# Patient Record
Sex: MALE | Race: BLACK OR AFRICAN AMERICAN
[De-identification: names, ages, dates, MRNs, and addresses within clinical notes are randomized per-mention and may not be internally consistent; named-entity substitution may affect disease eponyms.]

---

## 2020-08-31 ENCOUNTER — HOSPITAL ENCOUNTER (EMERGENCY)
Dept: HOSPITAL 53 - M ED | Age: 30
Discharge: HOME | End: 2020-08-31
Payer: SELF-PAY

## 2020-08-31 VITALS — DIASTOLIC BLOOD PRESSURE: 81 MMHG | SYSTOLIC BLOOD PRESSURE: 134 MMHG

## 2020-08-31 VITALS — HEIGHT: 69 IN | BODY MASS INDEX: 29.68 KG/M2 | WEIGHT: 200.42 LBS
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
## 2020-08-31 DIAGNOSIS — S93.401A: Primary | ICD-10-CM

## 2020-09-28 NOTE — REP
RIGHT ANKLE SERIES 



CLINICAL: Trauma/injury.



TECHNIQUE: AP, lateral, bilateral oblique views of the right ankle. 



FINDINGS: 

No acute fracture or dislocation. Skeletal structures and joint spaces are 
intact. Ankle mortise intact. Soft tissues are normal. No subcutaneous emphysema
or foreign body.  



IMPRESSION: 

No acute fracture or dislocation.  

MTDD

## 2020-10-08 ENCOUNTER — HOSPITAL ENCOUNTER (EMERGENCY)
Dept: HOSPITAL 53 - M ED | Age: 30
Discharge: HOME | End: 2020-10-08
Payer: COMMERCIAL

## 2020-10-08 VITALS — SYSTOLIC BLOOD PRESSURE: 124 MMHG | DIASTOLIC BLOOD PRESSURE: 65 MMHG

## 2020-10-08 VITALS — WEIGHT: 215.39 LBS | BODY MASS INDEX: 31.9 KG/M2 | HEIGHT: 69 IN

## 2020-10-08 DIAGNOSIS — R10.9: ICD-10-CM

## 2020-10-08 DIAGNOSIS — R51.9: ICD-10-CM

## 2020-10-08 DIAGNOSIS — R19.7: ICD-10-CM

## 2020-10-08 DIAGNOSIS — R11.2: Primary | ICD-10-CM

## 2020-10-08 LAB
ALBUMIN SERPL BCG-MCNC: 4.8 GM/DL (ref 3.2–5.2)
ALT SERPL W P-5'-P-CCNC: 28 U/L (ref 12–78)
BASOPHILS # BLD AUTO: 0 10^3/UL (ref 0–0.2)
BASOPHILS NFR BLD AUTO: 0.1 % (ref 0–1)
BILIRUB CONJ SERPL-MCNC: 0.2 MG/DL (ref 0–0.2)
BILIRUB SERPL-MCNC: 1 MG/DL (ref 0.2–1)
EOSINOPHIL # BLD AUTO: 0 10^3/UL (ref 0–0.5)
EOSINOPHIL NFR BLD AUTO: 0.1 % (ref 0–3)
HCT VFR BLD AUTO: 44 % (ref 42–52)
HGB BLD-MCNC: 14.9 G/DL (ref 13.5–17.5)
LIPASE SERPL-CCNC: 132 U/L (ref 73–393)
LYMPHOCYTES # BLD AUTO: 1.3 10^3/UL (ref 1.5–5)
LYMPHOCYTES NFR BLD AUTO: 8.1 % (ref 24–44)
MCH RBC QN AUTO: 30.3 PG (ref 27–33)
MCHC RBC AUTO-ENTMCNC: 33.9 G/DL (ref 32–36.5)
MCV RBC AUTO: 89.6 FL (ref 80–96)
MONOCYTES # BLD AUTO: 0.5 10^3/UL (ref 0–0.8)
MONOCYTES NFR BLD AUTO: 3.3 % (ref 0–5)
NEUTROPHILS # BLD AUTO: 13.6 10^3/UL (ref 1.5–8.5)
NEUTROPHILS NFR BLD AUTO: 87.9 % (ref 36–66)
PLATELET # BLD AUTO: 286 10^3/UL (ref 150–450)
PROT SERPL-MCNC: 8.7 GM/DL (ref 6.4–8.2)
RBC # BLD AUTO: 4.91 10^6/UL (ref 4.3–6.1)
WBC # BLD AUTO: 15.5 10^3/UL (ref 4–10)

## 2020-10-08 PROCEDURE — 80076 HEPATIC FUNCTION PANEL: CPT

## 2020-10-08 PROCEDURE — 80047 BASIC METABLC PNL IONIZED CA: CPT

## 2020-10-08 PROCEDURE — 83605 ASSAY OF LACTIC ACID: CPT

## 2020-10-08 PROCEDURE — 96372 THER/PROPH/DIAG INJ SC/IM: CPT

## 2020-10-08 PROCEDURE — 83690 ASSAY OF LIPASE: CPT

## 2020-10-08 PROCEDURE — 96365 THER/PROPH/DIAG IV INF INIT: CPT

## 2020-10-08 PROCEDURE — 99284 EMERGENCY DEPT VISIT MOD MDM: CPT

## 2020-10-08 PROCEDURE — 96361 HYDRATE IV INFUSION ADD-ON: CPT

## 2020-10-08 PROCEDURE — 85025 COMPLETE CBC W/AUTO DIFF WBC: CPT

## 2020-10-08 PROCEDURE — 96375 TX/PRO/DX INJ NEW DRUG ADDON: CPT

## 2020-10-08 PROCEDURE — 74177 CT ABD & PELVIS W/CONTRAST: CPT

## 2020-10-08 NOTE — REPVR
PROCEDURE INFORMATION: 

Exam: CT Abdomen And Pelvis With Contrast 

Exam date and time: 10/8/2020 12:19 PM 

Age: 30 years old 

Clinical indication: Nausea and vomiting and other: Diarrhea; Abdominal pain; 

Additional info: Lower abd pain, n/v/d 



TECHNIQUE: 

Imaging protocol: Computed tomography of the abdomen and pelvis with 

intravenous contrast. 

Radiation optimization: All CT scans at this facility use at least one of these 

dose optimization techniques: automated exposure control; mA and/or kV 

adjustment per patient size (includes targeted exams where dose is matched to 

clinical indication); or iterative reconstruction. 

Contrast material: ISOVUE 370; Contrast volume: 100 ml; Contrast route: 

INTRAVENOUS (IV);  



COMPARISON: 

No relevant prior studies available. 



FINDINGS: 

Liver: Normal. No mass. 

Gallbladder and bile ducts: Normal. No calcified stones. No ductal dilation. 

Pancreas: Normal. No ductal dilation. 

Spleen: Normal. No splenomegaly. 

Adrenals: Normal. No mass. 

Kidneys and ureters: Normal. No hydronephrosis. 

Stomach and bowel: Unremarkable. No obstruction. No mucosal thickening. 

Appendix: No evidence of appendicitis. 



Intraperitoneal space: Unremarkable. No free air. No significant fluid 

collection. 

Vasculature: Unremarkable. No abdominal aortic aneurysm. 

Lymph nodes: Unremarkable. No enlarged lymph nodes. 

Urinary bladder: Unremarkable as visualized. 

Reproductive: Unremarkable as visualized. 

Bones/joints: Unremarkable. No acute fracture. 

Soft tissues: Unremarkable. 



IMPRESSION: 

No acute findings. 



Electronically signed by: Alessandra Morgan On 10/08/2020  12:58:12 PM

## 2021-09-26 ENCOUNTER — HOSPITAL ENCOUNTER (INPATIENT)
Dept: HOSPITAL 53 - M ED | Age: 31
LOS: 3 days | Discharge: HOME | DRG: 248 | End: 2021-09-29
Attending: GENERAL PRACTICE | Admitting: STUDENT IN AN ORGANIZED HEALTH CARE EDUCATION/TRAINING PROGRAM
Payer: MEDICAID

## 2021-09-26 VITALS — SYSTOLIC BLOOD PRESSURE: 157 MMHG | DIASTOLIC BLOOD PRESSURE: 93 MMHG

## 2021-09-26 VITALS — BODY MASS INDEX: 30.4 KG/M2 | WEIGHT: 205.25 LBS | HEIGHT: 69 IN

## 2021-09-26 VITALS — SYSTOLIC BLOOD PRESSURE: 153 MMHG | DIASTOLIC BLOOD PRESSURE: 80 MMHG

## 2021-09-26 DIAGNOSIS — E87.6: ICD-10-CM

## 2021-09-26 DIAGNOSIS — R11.2: ICD-10-CM

## 2021-09-26 DIAGNOSIS — F12.90: ICD-10-CM

## 2021-09-26 DIAGNOSIS — D72.829: ICD-10-CM

## 2021-09-26 DIAGNOSIS — A04.0: Primary | ICD-10-CM

## 2021-09-26 LAB
ALBUMIN SERPL BCG-MCNC: 4.2 GM/DL (ref 3.2–5.2)
ALT SERPL W P-5'-P-CCNC: 23 U/L (ref 12–78)
AMPHETAMINES UR QL SCN: NEGATIVE
BARBITURATES UR QL SCN: NEGATIVE
BASOPHILS # BLD AUTO: 0 10^3/UL (ref 0–0.2)
BASOPHILS NFR BLD AUTO: 0.2 % (ref 0–1)
BENZODIAZ UR QL SCN: NEGATIVE
BILIRUB CONJ SERPL-MCNC: 0.2 MG/DL (ref 0–0.2)
BILIRUB SERPL-MCNC: 0.8 MG/DL (ref 0.2–1)
BUN SERPL-MCNC: 14 MG/DL (ref 7–18)
BZE UR QL SCN: NEGATIVE
CALCIUM SERPL-MCNC: 9.2 MG/DL (ref 8.5–10.1)
CANNABINOIDS UR QL SCN: POSITIVE
CHLORIDE SERPL-SCNC: 101 MEQ/L (ref 98–107)
CO2 SERPL-SCNC: 27 MEQ/L (ref 21–32)
CREAT SERPL-MCNC: 1.09 MG/DL (ref 0.7–1.3)
CRP SERPL-MCNC: 0.45 MG/DL (ref 0–0.3)
EOSINOPHIL # BLD AUTO: 0 10^3/UL (ref 0–0.5)
EOSINOPHIL NFR BLD AUTO: 0 % (ref 0–3)
ERYTHROCYTE [SEDIMENTATION RATE] IN BLOOD BY WESTERGREN METHOD: 7 MM/HR (ref 0–15)
GFR SERPL CREATININE-BSD FRML MDRD: > 60 ML/MIN/{1.73_M2} (ref 60–?)
GLUCOSE SERPL-MCNC: 115 MG/DL (ref 70–100)
HCT VFR BLD AUTO: 41 % (ref 42–52)
HGB BLD-MCNC: 13.8 G/DL (ref 13.5–17.5)
IRON SERPL-MCNC: 95 UG/DL (ref 65–175)
LIPASE SERPL-CCNC: 66 U/L (ref 73–393)
LYMPHOCYTES # BLD AUTO: 1.3 10^3/UL (ref 1.5–5)
LYMPHOCYTES NFR BLD AUTO: 7.6 % (ref 24–44)
MCH RBC QN AUTO: 30.9 PG (ref 27–33)
MCHC RBC AUTO-ENTMCNC: 33.7 G/DL (ref 32–36.5)
MCV RBC AUTO: 91.7 FL (ref 80–96)
METHADONE UR QL SCN: NEGATIVE
MONOCYTES # BLD AUTO: 0.5 10^3/UL (ref 0–0.8)
MONOCYTES NFR BLD AUTO: 3.1 % (ref 2–8)
NEUTROPHILS # BLD AUTO: 15.1 10^3/UL (ref 1.5–8.5)
NEUTROPHILS NFR BLD AUTO: 88.7 % (ref 36–66)
OPIATES UR QL SCN: NEGATIVE
PCP UR QL SCN: NEGATIVE
PLATELET # BLD AUTO: 225 10^3/UL (ref 150–450)
POTASSIUM SERPL-SCNC: 3.5 MEQ/L (ref 3.5–5.1)
PROT SERPL-MCNC: 7.4 GM/DL (ref 6.4–8.2)
RBC # BLD AUTO: 4.47 10^6/UL (ref 4.3–6.1)
RSV RNA NPH QL NAA+PROBE: NEGATIVE
SODIUM SERPL-SCNC: 136 MEQ/L (ref 136–145)
WBC # BLD AUTO: 17 10^3/UL (ref 4–10)

## 2021-09-26 RX ADMIN — DICYCLOMINE HYDROCHLORIDE SCH MG: 10 CAPSULE ORAL at 20:43

## 2021-09-26 RX ADMIN — DEXTROSE MONOHYDRATE SCH MG: 50 INJECTION, SOLUTION INTRAVENOUS at 18:43

## 2021-09-26 RX ADMIN — SODIUM CHLORIDE SCH MLS/HR: 9 INJECTION, SOLUTION INTRAVENOUS at 18:43

## 2021-09-26 RX ADMIN — DEXTROSE MONOHYDRATE SCH MLS/HR: 5 INJECTION INTRAVENOUS at 23:57

## 2021-09-26 RX ADMIN — DICYCLOMINE HYDROCHLORIDE SCH MG: 10 CAPSULE ORAL at 23:57

## 2021-09-26 RX ADMIN — ONDANSETRON PRN MG: 2 INJECTION INTRAMUSCULAR; INTRAVENOUS at 20:47

## 2021-09-26 RX ADMIN — DEXTROSE MONOHYDRATE SCH MLS/HR: 5 INJECTION INTRAVENOUS at 18:43

## 2021-09-26 NOTE — REP
INDICATION:

diffuse abd pain, fever, vomiting.



COMPARISON:

10/08/2020



TECHNIQUE:

Standard helical technique after the intravenous administration of 100 cc Isovue 370



FINDINGS:

The lung bases are clear and unchanged.



The liver, gallbladder, spleen, pancreas, adrenal glands, and kidneys are within

normal limits.  The abdominal aorta and para-aortic regions are within normal limits.

There is no evidence of free fluid or free air.  There is no evidence of a mass or

adenopathy.



There is evidence of spiking of the mesentery involving a loop of small bowel in the

right upper quadrant.  This has developed since the last exam.



Bone window technique throughout the examination shows no significant change in

appearance of the osseous structures.



IMPRESSION:

Findings involving a loop of small bowel in the right upper quadrant as described

above.  This could be secondary to inflammatory bowel disease such as Crohn's disease.

This needs to be correlated clinically with appropriate follow-up.





<Electronically signed by Garry Kang > 09/26/21 2053

## 2021-09-26 NOTE — REP
INDICATION:

cough, fever, n/v.



COMPARISON:

None.



TECHNIQUE:

PA and lateral



FINDINGS:

The superior mediastinal structures are midline.  The cardiac silhouette is

unremarkable in size, shape, and position.  The diaphragmatic surfaces of the lungs

are regular, and the costophrenic angles are clear.  The pulmonary fields are clear.

The imaged osseous structures are intact.





IMPRESSION:

There is no acute cardiopulmonary disease.





<Electronically signed by Garry Kang > 09/26/21 3698

## 2021-09-26 NOTE — HPEPDOC
General


Date of Admission


Sep 26, 2021 at 16:25


Date of Service:  Sep 26, 2021


Chief Complaint


The patient is a 31-year-old male admitted with a reason for visit of 

Intractable Abd Pain,Nausea Vomiting And Diarrhea.





History of Present Illness


Mr. Kelly is a 31 year old male with marijuana use who presents with 

intractable nausea, vomiting, and diarrhea. Initially began 4 days ago, it 

started with nausea and abdominal pain. About 2 days ago, he had fever, nausea 

with vomiting, persistent abdominal cramping, soft diarrhea without blood. He 

has not been able to keep any food or liquid's down. He has about 2 episodes of 

diarrhea in a day. Denies sick contacts, recent antibiotics, or hospital

izations. He had this happened before in the past, the last episode a few months

ago. Patient admits to marijuana use. On admission, patient has been afebrile. 

Blood pressure stable and patient is not tachycardic. Patient does have 

leukocytosis of 17 and lactic acid of 2.1. There is no renal dysfunction. CT 

abd/pelvis with IV contrast suggest possible inflammatory bowel disease with 

involvement of the right upper quadrant small bowel. ESR 7 and CRP 0.45. Patient

will be admitted for intractable nausea/vomiting and abdominal pain with 

diarrhea.





Home Medications


No Active Prescriptions or Reported Meds





Allergies


Coded Allergies:  


     No Known Allergies (Unverified , 8/31/20)





Past Medical History


Medical History


Denies past medical history


Surgical History


Denies past surgical history





Family History


Father has hypertension





Social History


* Smoker:  former Smoker


Alcohol:  Denies


Drugs:  marijuana





A-FIB/CHADSVASC


A-FIB History


Current/History of A-Fib/PAF?:  No





Review of Systems


Constitutional:  Reports: Fever, Malaise, Fatigue, Other (cold sweats)


Eyes:  Denies: Vision change


ENT:  Denies: Sore Throat


Skin:  Denies: Rash


Pulmonary:  Denies: Cough


Cardiovascular:  Denies: Chest Pain


Gastrointestinal:  Reports: Nausea, Vomiting, Abdominal Pain, Diarrhea


Genitourinary:  Denies: Dysuria


Hematologic:  Denies: Bruising


Neurological:  Denies: Numbness





Physical Examination


General Exam:  Positive: Alert, Cooperative, Mild Distress


Eye Exam:  Negative: Sclera icteric


ENT Exam:  Positive: Atraumatic


Neck Exam:  Positive: Supple


Chest Exam:  Positive: Clear to auscultation


Heart Exam:  Positive: Rate Normal, Regular Rhythm


Abdomen Exam:  Positive: Normal bowel sounds, Soft, Tenderness


Extremity Exam:  Negative: Edema


Neuro Exam:  Positive: Normal Speech, Cranial Nerves 3-12 NL


Psych Exam:  Positive: Mental status NL, Mood NL





Vital Signs





Vital Signs








  Date Time  Temp Pulse Resp B/P (MAP) Pulse Ox O2 Delivery O2 Flow Rate FiO2


 


9/26/21 15:45 98.2 71 18 146/90 (108) 99 Room Air  











Laboratory Data


Labs 24H


Laboratory Tests 2


9/26/21 11:28: 


Immature Granulocyte % (Auto) 0.4, Neutrophils (%) (Auto) 88.7H, Lymphocytes (%)

(Auto) 7.6L, Monocytes (%) (Auto) 3.1, Eosinophils (%) (Auto) 0.0, Basophils (%)

(Auto) 0.2, Neutrophils # (Auto) 15.1H, Lymphocytes # (Auto) 1.3L, Monocytes # 

(Auto) 0.5, Eosinophils # (Auto) 0.0, Basophils # (Auto) 0.0, Nucleated Red 

Blood Cells % (auto) 0.0, Erythrocyte Sedimentation Rate 7, Anion Gap 8, 

Glomerular Filtration Rate > 60.0, Calcium Level 9.2, Total Bilirubin 0.8, 

Direct Bilirubin 0.2, Aspartate Amino Transf (AST/SGOT) 19, Alanine Ami

notransferase (ALT/SGPT) 23, Alkaline Phosphatase 91, C-Reactive Protein, 

Quantitative 0.45H, Total Protein 7.4, Albumin 4.2, Albumin/Globulin Ratio 1.3, 

Lipase 66L, Coronavirus (COVID-19)(PCR) NEGATIVE, Influenza Type A (RT-PCR) 

NEGATIVE, Influenza Type B (RT-PCR) NEGATIVE, Respiratory Syncytial Virus (PCR) 

NEGATIVE


9/26/21 12:46: Lactic Acid Level 2.1*H


9/26/21 13:39: 


Urine Color YELLOW, Urine Appearance HAZY, Urine pH 9.0, Urine Specific Gravity 

1.048, Urine Protein 1+H, Urine Glucose (UA) NEGATIVE, Urine Ketones 2+H, Urine 

Blood NEGATIVE, Urine Nitrite NEGATIVE, Urine Bilirubin NEGATIVE, Urine 

Urobilinogen 0.2, Urine Leukocyte Esterase NEGATIVE, Urine WBC (Auto) 0, Urine 

RBC (Auto) 2, Urine Hyaline Casts (Auto) 0, Urine Bacteria (Auto) NEGATIVE, 

Urine Squamous Epithelial Cells 0, Urine Calcium Oxalate Cryst (Auto) SMALL, 

Urine Amorphous Sediment SMALLH, Urine Mucus (Auto) SMALL, Urine Sperm (Auto) , 

Urine Opiates Screen NEGATIVE, Urine Methadone Screen NEGATIVE, Urine 

Barbiturates Screen NEGATIVE, Urine Phencyclidine Screen NEGATIVE, Urine 

Amphetamines Screen NEGATIVE, Urine Benzodiazepines Screen NEGATIVE, Urine 

Cocaine Metabolite Screen NEGATIVE, Urine Cannabinoids Screen POSITIVEH


CBC/BMP


Laboratory Tests


9/26/21 11:28








Microbiology





Microbiology


9/26/21 Blood Culture, Received


          Pending


9/26/21 Blood Culture, Received


          Pending





 Assessment/Plan


Mr. Kelly is a 31 year old male with marijuana use who presents with 

intractable nausea, vomiting, and diarrhea. Differential include infectious 

colitis vs cannabis hyperemesis syndrome vs Crohn's disease. Will order GI panel

to look for infectious causes. Will empirically start patient on Zosyn. Will 

order calprotectin, stool polys, vitamin B12, vitamin D, and iron to work 

Crohn's disease. Otherwise, provider supportive care with IVF, Bentyl, and 

antiemetics.





Plan / VTE


VTE Prophylaxis Ordered?:  Yes





Plan


Plan


1.  Intractable nausea vomiting


Patient does use marijuana.  U tox is positive for cannabis


Possibly cannabis hyperemesis syndrome


Supportive care


Clear liquid diet and IV fluids


As needed Zofran


IV Protonix





2.  Abdominal cramping and diarrhea


We will evaluate with GI panel, calprotectin, and stool polys


Possibly infectious


Empirically started on Zosyn day 1


Bentyl to help with abdominal cramping





3.  Marijuana use disorder


Supportive care





4.  Leukocytosis


May be reactive to vomiting versus intra-abdominal infectious source


Patient started on antibiotics


Monitor CBC





5.  DVT prophylaxis


SCDs and teds





Disposition: Pending clinical improvement











ARSEN SETH DO               Sep 26, 2021 17:56

## 2021-09-26 NOTE — REPVR
PROCEDURE INFORMATION: 

Exam: CT Head Without Contrast 

Exam date and time: 9/26/2021 6:54 PM 

Age: 31 years old 

Clinical indication: Pain; Dizziness; Headache; Additional info: Headache, 

dizziness, vomiting 



TECHNIQUE: 

Imaging protocol: Computed tomography of the head without contrast. 

Radiation optimization: All CT scans at this facility use at least one of these 

dose optimization techniques: automated exposure control; mA and/or kV 

adjustment per patient size (includes targeted exams where dose is matched to 

clinical indication); or iterative reconstruction. 



COMPARISON: 

CT Head without contrast 12/30/2014 1:43 PM 



FINDINGS: 

Brain: There is no evidence of intracranial bleed. The gray-white 

differentiation appears preserved. 

Cerebral ventricles: Normal-appearing ventricles. 

Paranasal sinuses: Sure there is a 1 cm mucous retention cyst right sphenoid 

sinus. Paranasal sinuses are otherwise clear. 

Mastoid air cells: Clear mastoid air cells. There is no evidence of 

intracranial bleed. 

Orbital cavity: The orbits appear symmetric. 

Bones/joints: Unremarkable. No acute fracture. 

Soft tissues: Unremarkable. 



IMPRESSION: 

Normal appearing CT scan of the brain. 



Electronically signed by: Khanh Rubio On 09/26/2021  19:49:23 PM

## 2021-09-27 VITALS — DIASTOLIC BLOOD PRESSURE: 92 MMHG | SYSTOLIC BLOOD PRESSURE: 155 MMHG

## 2021-09-27 VITALS — DIASTOLIC BLOOD PRESSURE: 80 MMHG | SYSTOLIC BLOOD PRESSURE: 154 MMHG

## 2021-09-27 VITALS — SYSTOLIC BLOOD PRESSURE: 152 MMHG | DIASTOLIC BLOOD PRESSURE: 93 MMHG

## 2021-09-27 LAB
25(OH)D3 SERPL-MCNC: 21.4 NG/ML (ref 30–100)
BUN SERPL-MCNC: 9 MG/DL (ref 7–18)
CALCIUM SERPL-MCNC: 9.1 MG/DL (ref 8.5–10.1)
CHLORIDE SERPL-SCNC: 105 MEQ/L (ref 98–107)
CO2 SERPL-SCNC: 21 MEQ/L (ref 21–32)
CREAT SERPL-MCNC: 1.11 MG/DL (ref 0.7–1.3)
GFR SERPL CREATININE-BSD FRML MDRD: > 60 ML/MIN/{1.73_M2} (ref 60–?)
GLUCOSE SERPL-MCNC: 101 MG/DL (ref 70–100)
HCT VFR BLD AUTO: 39.2 % (ref 42–52)
HGB BLD-MCNC: 13.6 G/DL (ref 13.5–17.5)
MCH RBC QN AUTO: 31 PG (ref 27–33)
MCHC RBC AUTO-ENTMCNC: 34.7 G/DL (ref 32–36.5)
MCV RBC AUTO: 89.3 FL (ref 80–96)
PLATELET # BLD AUTO: 227 10^3/UL (ref 150–450)
POTASSIUM SERPL-SCNC: 3 MEQ/L (ref 3.5–5.1)
RBC # BLD AUTO: 4.39 10^6/UL (ref 4.3–6.1)
SODIUM SERPL-SCNC: 138 MEQ/L (ref 136–145)
VIT B12 SERPL-MCNC: 1230 PG/ML (ref 247–911)
WBC # BLD AUTO: 14.4 10^3/UL (ref 4–10)

## 2021-09-27 RX ADMIN — DICYCLOMINE HYDROCHLORIDE SCH MG: 10 CAPSULE ORAL at 23:23

## 2021-09-27 RX ADMIN — SODIUM CHLORIDE SCH MLS/HR: 9 INJECTION, SOLUTION INTRAVENOUS at 08:41

## 2021-09-27 RX ADMIN — DICYCLOMINE HYDROCHLORIDE SCH MG: 10 CAPSULE ORAL at 13:01

## 2021-09-27 RX ADMIN — DEXTROSE MONOHYDRATE SCH MLS/HR: 5 INJECTION INTRAVENOUS at 13:01

## 2021-09-27 RX ADMIN — ONDANSETRON PRN MG: 2 INJECTION INTRAMUSCULAR; INTRAVENOUS at 13:11

## 2021-09-27 RX ADMIN — SODIUM CHLORIDE SCH MLS/HR: 9 INJECTION, SOLUTION INTRAVENOUS at 23:23

## 2021-09-27 RX ADMIN — DICYCLOMINE HYDROCHLORIDE SCH MG: 10 CAPSULE ORAL at 05:35

## 2021-09-27 RX ADMIN — ONDANSETRON PRN MG: 2 INJECTION INTRAMUSCULAR; INTRAVENOUS at 23:28

## 2021-09-27 RX ADMIN — PROMETHAZINE HYDROCHLORIDE PRN MG: 25 INJECTION INTRAMUSCULAR; INTRAVENOUS at 18:59

## 2021-09-27 RX ADMIN — DEXTROSE MONOHYDRATE SCH MLS/HR: 5 INJECTION INTRAVENOUS at 05:35

## 2021-09-27 RX ADMIN — DEXTROSE MONOHYDRATE SCH MG: 50 INJECTION, SOLUTION INTRAVENOUS at 17:34

## 2021-09-27 RX ADMIN — DEXTROSE MONOHYDRATE SCH MLS/HR: 5 INJECTION INTRAVENOUS at 23:23

## 2021-09-27 RX ADMIN — DEXTROSE MONOHYDRATE SCH MLS/HR: 5 INJECTION INTRAVENOUS at 17:34

## 2021-09-27 RX ADMIN — DICYCLOMINE HYDROCHLORIDE SCH MG: 10 CAPSULE ORAL at 17:34

## 2021-09-27 NOTE — IPNPDOC
Subjective


Date Seen


The patient was seen on 9/27/21.





Subjective


Chief Complaint/HPI


Mr. Kelly is a 31 year old male with marijuana use who presents with 

intractable nausea, vomiting, and diarrhea.  Yesterday patient received Zofran 

and it has helped.  This morning, he tells me he is able to keep small amounts 

of liquid down.  Still has nausea.  No bowel movement since yesterday.





Objective


Physical Examination


General Exam:  Positive: Alert, Cooperative


Eye Exam:  Negative: Sclera icteric


ENT Exam:  Positive: Atraumatic


Neck Exam:  Positive: Supple


Chest Exam:  Positive: Clear to auscultation


Heart Exam:  Positive: Rate Normal, Regular Rhythm


Abdomen Exam:  Positive: Normal bowel sounds, Soft, Tenderness


Extremity Exam:  Negative: Edema


Neuro Exam:  Positive: Normal Speech, Cranial Nerves 3-12 NL


Psych Exam:  Positive: Mental status NL, Mood NL





Assessment /Plan


Assessment


Mr. Kelly is a 31 year old male with marijuana use who presents with in

tractable nausea, vomiting, and diarrhea. Differential include infectious 

colitis vs cannabis hyperemesis syndrome vs Crohn's disease. Will order GI panel

to look for infectious causes. Will empirically start patient on Zosyn. Will 

order calprotectin, stool polys, vitamin B12, vitamin D, and iron to work 

Crohn's disease. Otherwise, provider supportive care with IVF, Bentyl, and 

antiemetics.





Plan/VTE


VTE Prophylaxis Ordered?:  Yes





Plan


1.  Intractable nausea vomiting


Patient does use marijuana.  U tox is positive for cannabis


Possibly cannabis hyperemesis syndrome


Supportive care


Clear liquid diet and IV fluids


As needed Zofran


IV Protonix





2.  Abdominal cramping and diarrhea


We will evaluate with GI panel, calprotectin, and stool polys


Possibly infectious


Empirically started on Zosyn day 2


Bentyl to help with abdominal cramping





3.  Marijuana use disorder


Supportive care





4.  Leukocytosis


May be reactive to vomiting versus intra-abdominal infectious source


Patient started on antibiotics


Monitor CBC





5.  DVT prophylaxis


SCDs and teds





Disposition: Pending clinical improvement





VS, I&O, 24H, Fishbone


Vital Signs/I&O





Vital Signs








  Date Time  Temp Pulse Resp B/P (MAP) Pulse Ox O2 Delivery O2 Flow Rate FiO2


 


9/27/21 05:38 99.0 57 18 155/92 (113) 98 Room Air  














I&O- Last 24 Hours up to 6 AM 


 


 9/27/21





 06:00


 


Intake Total 2720 ml


 


Output Total 400 ml


 


Balance 2320 ml











Laboratory Data


24H LABS


Laboratory Tests 2


9/26/21 11:28: 


Immature Granulocyte % (Auto) 0.4, Neutrophils (%) (Auto) 88.7H, Lymphocytes (%)

(Auto) 7.6L, Monocytes (%) (Auto) 3.1, Eosinophils (%) (Auto) 0.0, Basophils (%)

(Auto) 0.2, Neutrophils # (Auto) 15.1H, Lymphocytes # (Auto) 1.3L, Monocytes # 

(Auto) 0.5, Eosinophils # (Auto) 0.0, Basophils # (Auto) 0.0, Nucleated Red 

Blood Cells % (auto) 0.0, Erythrocyte Sedimentation Rate 7, Anion Gap 8, 

Glomerular Filtration Rate > 60.0, Calcium Level 9.2, Total Bilirubin 0.8, 

Direct Bilirubin 0.2, Aspartate Amino Transf (AST/SGOT) 19, Alanine Amin

otransferase (ALT/SGPT) 23, Alkaline Phosphatase 91, C-Reactive Protein, 

Quantitative 0.45H, Total Protein 7.4, Albumin 4.2, Albumin/Globulin Ratio 1.3, 

Lipase 66L, Coronavirus (COVID-19)(PCR) NEGATIVE, Influenza Type A (RT-PCR) 

NEGATIVE, Influenza Type B (RT-PCR) NEGATIVE, Respiratory Syncytial Virus (PCR) 

NEGATIVE


9/26/21 12:46: Lactic Acid Level 2.1*H


9/26/21 13:39: 


Urine Color YELLOW, Urine Appearance HAZY, Urine pH 9.0, Urine Specific Gravity 

1.048, Urine Protein 1+H, Urine Glucose (UA) NEGATIVE, Urine Ketones 2+H, Urine 

Blood NEGATIVE, Urine Nitrite NEGATIVE, Urine Bilirubin NEGATIVE, Urine 

Urobilinogen 0.2, Urine Leukocyte Esterase NEGATIVE, Urine WBC (Auto) 0, Urine 

RBC (Auto) 2, Urine Hyaline Casts (Auto) 0, Urine Bacteria (Auto) NEGATIVE, 

Urine Squamous Epithelial Cells 0, Urine Calcium Oxalate Cryst (Auto) SMALL, 

Urine Amorphous Sediment SMALLH, Urine Mucus (Auto) SMALL, Urine Sperm (Auto) , 

Urine Opiates Screen NEGATIVE, Urine Methadone Screen NEGATIVE, Urine 

Barbiturates Screen NEGATIVE, Urine Phencyclidine Screen NEGATIVE, Urine 

Amphetamines Screen NEGATIVE, Urine Benzodiazepines Screen NEGATIVE, Urine 

Cocaine Metabolite Screen NEGATIVE, Urine Cannabinoids Screen POSITIVEH


9/26/21 18:48: 


Lactic Acid Followup at 4 Hours 2.0, Iron Level 95


CBC/BMP


Laboratory Tests


9/26/21 11:28








Microbiology





Microbiology


9/26/21 Blood Culture, Received


          Pending


9/26/21 Blood Culture, Received


          Pending











ARSEN SETH DO               Sep 27, 2021 10:04

## 2021-09-28 VITALS — SYSTOLIC BLOOD PRESSURE: 146 MMHG | DIASTOLIC BLOOD PRESSURE: 88 MMHG

## 2021-09-28 VITALS — SYSTOLIC BLOOD PRESSURE: 155 MMHG | DIASTOLIC BLOOD PRESSURE: 97 MMHG

## 2021-09-28 VITALS — SYSTOLIC BLOOD PRESSURE: 149 MMHG | DIASTOLIC BLOOD PRESSURE: 89 MMHG

## 2021-09-28 LAB
BUN SERPL-MCNC: 9 MG/DL (ref 7–18)
CALCIUM SERPL-MCNC: 8.5 MG/DL (ref 8.5–10.1)
CHLORIDE SERPL-SCNC: 106 MEQ/L (ref 98–107)
CO2 SERPL-SCNC: 26 MEQ/L (ref 21–32)
CREAT SERPL-MCNC: 1.05 MG/DL (ref 0.7–1.3)
GFR SERPL CREATININE-BSD FRML MDRD: > 60 ML/MIN/{1.73_M2} (ref 60–?)
GLUCOSE SERPL-MCNC: 96 MG/DL (ref 70–100)
HCT VFR BLD AUTO: 38.1 % (ref 42–52)
HGB BLD-MCNC: 12.8 G/DL (ref 13.5–17.5)
MCH RBC QN AUTO: 30.8 PG (ref 27–33)
MCHC RBC AUTO-ENTMCNC: 33.6 G/DL (ref 32–36.5)
MCV RBC AUTO: 91.6 FL (ref 80–96)
PLATELET # BLD AUTO: 209 10^3/UL (ref 150–450)
POTASSIUM SERPL-SCNC: 3.4 MEQ/L (ref 3.5–5.1)
RBC # BLD AUTO: 4.16 10^6/UL (ref 4.3–6.1)
SODIUM SERPL-SCNC: 138 MEQ/L (ref 136–145)
WBC # BLD AUTO: 9.8 10^3/UL (ref 4–10)

## 2021-09-28 RX ADMIN — DEXTROSE MONOHYDRATE SCH MG: 50 INJECTION, SOLUTION INTRAVENOUS at 18:03

## 2021-09-28 RX ADMIN — DICYCLOMINE HYDROCHLORIDE SCH MG: 10 CAPSULE ORAL at 23:32

## 2021-09-28 RX ADMIN — SODIUM CHLORIDE SCH MLS/HR: 9 INJECTION, SOLUTION INTRAVENOUS at 16:35

## 2021-09-28 RX ADMIN — METOCLOPRAMIDE SCH MG: 5 INJECTION, SOLUTION INTRAMUSCULAR; INTRAVENOUS at 20:16

## 2021-09-28 RX ADMIN — KETOROLAC TROMETHAMINE SCH MG: 30 INJECTION, SOLUTION INTRAMUSCULAR at 20:16

## 2021-09-28 RX ADMIN — DEXTROSE MONOHYDRATE SCH MLS/HR: 5 INJECTION INTRAVENOUS at 18:03

## 2021-09-28 RX ADMIN — SODIUM CHLORIDE SCH MLS/HR: 9 INJECTION, SOLUTION INTRAVENOUS at 20:17

## 2021-09-28 RX ADMIN — SODIUM CHLORIDE SCH MLS/HR: 9 INJECTION, SOLUTION INTRAVENOUS at 23:32

## 2021-09-28 RX ADMIN — DICYCLOMINE HYDROCHLORIDE SCH MG: 10 CAPSULE ORAL at 06:06

## 2021-09-28 RX ADMIN — DICYCLOMINE HYDROCHLORIDE SCH MG: 10 CAPSULE ORAL at 18:02

## 2021-09-28 RX ADMIN — METOCLOPRAMIDE SCH MG: 5 INJECTION, SOLUTION INTRAMUSCULAR; INTRAVENOUS at 07:30

## 2021-09-28 RX ADMIN — DEXTROSE MONOHYDRATE SCH MLS/HR: 5 INJECTION INTRAVENOUS at 13:32

## 2021-09-28 RX ADMIN — DICYCLOMINE HYDROCHLORIDE SCH MG: 10 CAPSULE ORAL at 13:32

## 2021-09-28 RX ADMIN — KETOROLAC TROMETHAMINE SCH MG: 30 INJECTION, SOLUTION INTRAMUSCULAR at 09:55

## 2021-09-28 RX ADMIN — DEXTROSE MONOHYDRATE SCH MLS/HR: 5 INJECTION INTRAVENOUS at 06:06

## 2021-09-28 RX ADMIN — METOCLOPRAMIDE SCH MG: 5 INJECTION, SOLUTION INTRAMUSCULAR; INTRAVENOUS at 18:03

## 2021-09-28 RX ADMIN — METOCLOPRAMIDE SCH MG: 5 INJECTION, SOLUTION INTRAMUSCULAR; INTRAVENOUS at 13:32

## 2021-09-28 RX ADMIN — PROMETHAZINE HYDROCHLORIDE PRN MG: 25 INJECTION INTRAMUSCULAR; INTRAVENOUS at 03:32

## 2021-09-28 RX ADMIN — ONDANSETRON PRN MG: 2 INJECTION INTRAMUSCULAR; INTRAVENOUS at 08:16

## 2021-09-28 RX ADMIN — KETOROLAC TROMETHAMINE SCH MG: 30 INJECTION, SOLUTION INTRAMUSCULAR at 16:34

## 2021-09-28 NOTE — REP
INDICATION:

abd pain diarrhea small bowel spike in mesentery on ct abd..



COMPARISON:

Comparison CT study September 26, 2021.



TECHNIQUE:

The patient ingested oral Volumen for PO contrast per protocol.  0.6 mg of intravenous

glucagon is administered.  100 ml of Isovue 370 is given intravenously for intravenous

contrast.  Helical scanning is acquired.  Arterial phase and delayed phase imaging was

acquired.  Thick slab coronal and sagittal MIP images are generated.  In addition

coronal and sagittal multiplanar re-formation images are generated and reviewed along

with axial images.



FINDINGS:

Preliminary digital  radiograph is unremarkable.  The liver and the spleen are

normal in size homogeneous in texture.  No adrenal lesion is seen.  No abnormality is

noted in the pancreas or the gallbladder.  No retroperitoneal mass or adenopathy is

seen.  The kidneys enhance symmetrically and are morphologically intact.  Normal

appendix is seen in the right lower quadrant.  There is good luminal labeling with

oral volume in in the small intestine.  There is no evidence of mural thickening or

hyperenhancement in the small bowel loops to suggest inflammatory enteritis.  No

evidence of adenopathy.  The mesenteric streakiness described on the recent CT study

is again seen mild in degree.  Somewhat improved.  No free fluid is seen.  Delayed

acquisition images show no evidence of bowel wall abnormality.  There is no evidence

of free air or abnormal fluid collection.  No bony destructive lesion is seen.  SI

joints are intact without evidence of ankylosis or erosive change.



IMPRESSION:

The recently noted mesenteric fat streakiness is again seen, mild in degree and

somewhat improved.  No other evidence to suggest inflammatory bowel disease.

Otherwise negative.





<Electronically signed by Isac Palacios > 09/28/21 0521

## 2021-09-28 NOTE — IPN
PROGRESS NOTE



DATE:  09/28/2021



SUBJECTIVE: Patient denies fever or chills. Complains of bilateral lower

quadrant pain left greater than the right. Patient denies any joint pains,

muscle aches, mouth ulcers. No prior history of inflammatory bowel disease,

Crohn's or ulcerative colitis. Denies any bright red blood per rectum, melena,

black tarry stools. Patient has had 1 episode of diarrhea. GI panel is

canceled. Patient had 100.4 temperature yesterday at 20:35. 



OBJECTIVE: Current temperature 99, T-max 100.4, pulse 60, respiratory rate 18

blood pressure 155/89, 99% on room air. 

General: Awake, alert, oriented to person, place and time, answering questions

appropriately.

Lungs: Clear to auscultation, no wheezes, rhonchi or rales. 

Heart: S1 and S2 sinus rhythm. 

Abdomen: Soft, tender in the bilateral lower quadrants. No guarding or rebound.

Positive bowel sounds. No hepatosplenomegaly. 

Extremities: No cyanosis, clubbing or pitting edema. 



LABORATORY DATA: Laboratory data, microbiology reviewed.  



IMAGING STUDIES: Reviewed. 



ASSESSMENT: This is a 31-year-old male admitted on 09/26/2021 with intractable

nausea, vomiting, abdominal pain with prior history of marijuana use, unable to

keep liquids down with 2 episodes of diarrhea. Patient had CT abdomen and

pelvis with possible inflammatory bowel disease in the right upper quadrant.

Sedimentation rate and CRP were checked.



IMPRESSIONS/PLAN:

1.  Intractable nausea, vomiting, abdominal pain and cramping: GI panel is

    pending. Empirically placed on Zosyn day number 2, on a liquid diet, I.V.

    fluids, antiemetics, Toradol and Protonix. 



2.  Marijuana use with possible cannabis hyperemesis syndrome.



3.  Leukocytosis: May be reactive. Currently on Zosyn empirically. Awaiting GI

    panel and IBD serology.



late entry addendum:



enteropathogenic ecoli in GI panel: 

-dc abx

-supportive care, supplement electrolytes and continue ivfluids.



MTDD

## 2021-09-29 VITALS — SYSTOLIC BLOOD PRESSURE: 152 MMHG | DIASTOLIC BLOOD PRESSURE: 88 MMHG

## 2021-09-29 LAB
BUN SERPL-MCNC: 6 MG/DL (ref 7–18)
CALCIUM SERPL-MCNC: 8.5 MG/DL (ref 8.5–10.1)
CHLORIDE SERPL-SCNC: 107 MEQ/L (ref 98–107)
CO2 SERPL-SCNC: 26 MEQ/L (ref 21–32)
CREAT SERPL-MCNC: 0.93 MG/DL (ref 0.7–1.3)
GFR SERPL CREATININE-BSD FRML MDRD: > 60 ML/MIN/{1.73_M2} (ref 60–?)
GLUCOSE SERPL-MCNC: 94 MG/DL (ref 70–100)
HCT VFR BLD AUTO: 37.1 % (ref 42–52)
HGB BLD-MCNC: 12.5 G/DL (ref 13.5–17.5)
MAGNESIUM SERPL-MCNC: 2.2 MG/DL (ref 1.8–2.4)
MCH RBC QN AUTO: 31.1 PG (ref 27–33)
MCHC RBC AUTO-ENTMCNC: 33.7 G/DL (ref 32–36.5)
MCV RBC AUTO: 92.3 FL (ref 80–96)
PLATELET # BLD AUTO: 200 10^3/UL (ref 150–450)
POTASSIUM SERPL-SCNC: 3.6 MEQ/L (ref 3.5–5.1)
RBC # BLD AUTO: 4.02 10^6/UL (ref 4.3–6.1)
SODIUM SERPL-SCNC: 139 MEQ/L (ref 136–145)
WBC # BLD AUTO: 10.5 10^3/UL (ref 4–10)

## 2021-09-29 RX ADMIN — KETOROLAC TROMETHAMINE SCH MG: 30 INJECTION, SOLUTION INTRAMUSCULAR at 09:00

## 2021-09-29 RX ADMIN — KETOROLAC TROMETHAMINE SCH MG: 30 INJECTION, SOLUTION INTRAMUSCULAR at 03:07

## 2021-09-29 RX ADMIN — METOCLOPRAMIDE SCH MG: 5 INJECTION, SOLUTION INTRAMUSCULAR; INTRAVENOUS at 07:46

## 2021-09-29 RX ADMIN — DICYCLOMINE HYDROCHLORIDE SCH MG: 10 CAPSULE ORAL at 05:39

## 2021-09-29 RX ADMIN — SODIUM CHLORIDE SCH MLS/HR: 9 INJECTION, SOLUTION INTRAVENOUS at 05:39

## 2021-09-29 NOTE — DSES
DISCHARGE SUMMARY



DATE OF ADMISSION:  09/26/2021

DATE OF DISCHARGE:  09/29/2021



PRIMARY DISCHARGE DIAGNOSIS:

  1.  Enteropathogenic E. coli diarrhea.

  2.  Intractable nausea and vomiting secondary to marijuana use with cyclic

      vomiting syndrome. 

  3.  Hemodilutional anemia.

  4.  Hypokalemia.



DISCHARGE MEDICATIONS:

  1.  Cipro 500 b.i.d. for seven days.

  2.  Bacid one tablet with meals for seven days.



HOSPITAL COURSE: A 31-year-old male with a history of marijuana use who

presented with intractable nausea and vomiting four days prior to admission,

three episodes of loose watery stools at home. Patient was found to have a

white count of 17,000, lactic acid of 2, CT of the abdomen and pelvis showed

spiking in the mesentery and the small bowel. CT enterography was negative for

inflammatory bowel disease, sed rate was 7, CRP 0.45. Dr. Marvin,

gastroenterologist on-call was consulted and did not recommend EGD or

colonoscopy. GI panel grew out enteropathic E. coli. Patient was treated with

two days of intravenous Zosyn with decreased white count from 17,000 to normal

and on discharge is normal to 9.8. Discharged on Cipro as an outpatient.

Patient's diet was advanced from NPO to clear liquids and solid diet which he

tolerated well. 



PHYSICAL EXAMINATION: On discharge, temperature was 98.5, pulse 61, sinus,

respiratory rate 18, blood pressure 152/88, 99% on room air. General: Awake,

alert and oriented x3, answering questions appropriately. Lungs are clear to

auscultation. No wheezing, rales or rhonchi. Heart: S1 and S2, sinus rhythm.

Abdomen is soft, nontender and nondistended. Normoactive bowel sounds.

Extremities: No cyanosis, clubbing or pitting edema.



LABORATORY DATA: Microbiology and imaging studies: Please see the chart. 



TIME SPENT ON DISCHARGE: 30 minutes

MTDD

## 2021-09-30 LAB
BAKER'S YEAST IGG QN IA: 14 UNITS (ref 0–50)
CHITOBIOSIDE IGA SERPL IA-ACNC: 12 UNITS (ref 0–90)
LAMINARIBIOSIDE IGG SERPL IA-ACNC: 10 UNITS (ref 0–60)
MANNOBIOSIDE IGG SERPL IA-ACNC: 16 UNITS (ref 0–100)